# Patient Record
Sex: FEMALE | Race: WHITE | NOT HISPANIC OR LATINO
[De-identification: names, ages, dates, MRNs, and addresses within clinical notes are randomized per-mention and may not be internally consistent; named-entity substitution may affect disease eponyms.]

---

## 2022-09-12 ENCOUNTER — APPOINTMENT (OUTPATIENT)
Dept: NEUROLOGY | Facility: CLINIC | Age: 82
End: 2022-09-12

## 2022-09-12 VITALS
HEART RATE: 67 BPM | WEIGHT: 120 LBS | SYSTOLIC BLOOD PRESSURE: 141 MMHG | HEIGHT: 60 IN | BODY MASS INDEX: 23.56 KG/M2 | DIASTOLIC BLOOD PRESSURE: 83 MMHG

## 2022-09-12 DIAGNOSIS — Z78.9 OTHER SPECIFIED HEALTH STATUS: ICD-10-CM

## 2022-09-12 DIAGNOSIS — G47.09 OTHER INSOMNIA: ICD-10-CM

## 2022-09-12 DIAGNOSIS — Z81.8 FAMILY HISTORY OF OTHER MENTAL AND BEHAVIORAL DISORDERS: ICD-10-CM

## 2022-09-12 LAB
APTT BLD: 31.5 SEC
INR PPP: 1.05 RATIO
PT BLD: 12.4 SEC

## 2022-09-12 PROCEDURE — 99205 OFFICE O/P NEW HI 60 MIN: CPT

## 2022-09-12 RX ORDER — AMLODIPINE AND ATORVASTATIN 5; 10 MG/1; MG/1
5-10 TABLET, FILM COATED ORAL DAILY
Refills: 0 | Status: ACTIVE | COMMUNITY
Start: 2022-09-12

## 2022-09-12 RX ORDER — LOSARTAN POTASSIUM 50 MG/1
50 TABLET, FILM COATED ORAL DAILY
Refills: 0 | Status: ACTIVE | COMMUNITY
Start: 2022-09-12

## 2022-09-12 RX ORDER — METOPROLOL SUCCINATE 100 MG/1
100 TABLET, EXTENDED RELEASE ORAL DAILY
Refills: 0 | Status: ACTIVE | COMMUNITY
Start: 2022-09-12

## 2022-09-12 RX ORDER — PSYLLIUM HUSK 0.4 G
25 MCG CAPSULE ORAL
Refills: 0 | Status: ACTIVE | COMMUNITY
Start: 2022-09-12

## 2022-09-12 NOTE — REASON FOR VISIT
[Initial Evaluation] : an initial evaluation [Spouse] : spouse [Family Member] : family member [FreeTextEntry1] : Forgetfulness

## 2022-09-12 NOTE — PHYSICAL EXAM
[General Appearance - Alert] : alert [General Appearance - In No Acute Distress] : in no acute distress [Oriented To Time, Place, And Person] : oriented to person, place, and time [Impaired Insight] : insight and judgment were intact [Affect] : the affect was normal [Person] : oriented to person [Remote Intact] : remote memory intact [Registration Intact] : recent registration memory intact [Concentration Intact] : normal concentrating ability [Visual Intact] : visual attention was ~T not ~L decreased [Naming Objects] : no difficulty naming common objects [Repeating Phrases] : no difficulty repeating a phrase [Writing A Sentence] : no difficulty writing a sentence [Fluency] : fluency intact [Comprehension] : comprehension intact [Reading] : reading intact [Past History] : adequate knowledge of personal past history [Vocabulary] : adequate range of vocabulary [Total Score ___ / 30] : the patient achieved a score of [unfilled] /30 [Date / Time ___ / 5] : date / time [unfilled] / 5 [Place ___ / 5] : place [unfilled] / 5 [Registration ___ / 3] : registration [unfilled] / 3 [Serial Sevens ___/5] : serial sevens [unfilled] / 5 [Naming 2 Objects ___ / 2] : naming two objects [unfilled] / 2 [Repeating a Sentence ___ / 1] : repeating a sentence [unfilled] / 1 [Writing a Sentence ___ / 1] : write sentence [unfilled] / 1 [3-stage Verbal Command ___ / 3] : three-stage verbal command [unfilled] / 3 [Written Command ___ / 1] : written command [unfilled] / 1 [Copy a Design ___ / 1] : copy a design [unfilled] / 1 [Recall ___ / 3] : recall [unfilled] / 3 [Cranial Nerves Optic (II)] : visual acuity intact bilaterally,  visual fields full to confrontation, pupils equal round and reactive to light [Cranial Nerves Oculomotor (III)] : extraocular motion intact [Cranial Nerves Trigeminal (V)] : facial sensation intact symmetrically [Cranial Nerves Facial (VII)] : face symmetrical [Cranial Nerves Vestibulocochlear (VIII)] : hearing was intact bilaterally [Cranial Nerves Glossopharyngeal (IX)] : tongue and palate midline [Cranial Nerves Accessory (XI - Cranial And Spinal)] : head turning and shoulder shrug symmetric [Cranial Nerves Hypoglossal (XII)] : there was no tongue deviation with protrusion [Motor Strength] : muscle strength was normal in all four extremities [Involuntary Movements] : no involuntary movements were seen [No Muscle Atrophy] : normal bulk in all four extremities [Motor Handedness Right-Handed] : the patient is right hand dominant [Sensation Tactile Decrease] : light touch was intact [Balance] : balance was intact [2+] : Ankle jerk left 2+ [Sclera] : the sclera and conjunctiva were normal [PERRL With Normal Accommodation] : pupils were equal in size, round, reactive to light, with normal accommodation [Extraocular Movements] : extraocular movements were intact [Outer Ear] : the ears and nose were normal in appearance [Oropharynx] : the oropharynx was normal [Neck Appearance] : the appearance of the neck was normal [Neck Cervical Mass (___cm)] : no neck mass was observed [Jugular Venous Distention Increased] : there was no jugular-venous distention [Thyroid Diffuse Enlargement] : the thyroid was not enlarged [Thyroid Nodule] : there were no palpable thyroid nodules [Auscultation Breath Sounds / Voice Sounds] : lungs were clear to auscultation bilaterally [Heart Rate And Rhythm] : heart rate was normal and rhythm regular [Heart Sounds] : normal S1 and S2 [Heart Sounds Gallop] : no gallops [Murmurs] : no murmurs [Heart Sounds Pericardial Friction Rub] : no pericardial rub [Full Pulse] : the pedal pulses are present [Edema] : there was no peripheral edema [Bowel Sounds] : normal bowel sounds [Abdomen Soft] : soft [Abdomen Tenderness] : non-tender [Abdomen Mass (___ Cm)] : no abdominal mass palpated [No CVA Tenderness] : no ~M costovertebral angle tenderness [No Spinal Tenderness] : no spinal tenderness [Abnormal Walk] : normal gait [Nail Clubbing] : no clubbing  or cyanosis of the fingernails [Musculoskeletal - Swelling] : no joint swelling seen [Motor Tone] : muscle strength and tone were normal [Skin Color & Pigmentation] : normal skin color and pigmentation [Skin Turgor] : normal skin turgor [] : no rash [Place] : disoriented to place [Time] : disoriented to time [Short Term Intact] : short term memory impaired [Span Intact] : the attention span was decreased [Current Events] : inadequate knowledge of current events [Motor Strength Upper Extremities Bilaterally] : strength was normal in both upper extremities [Motor Strength Lower Extremities Bilaterally] : strength was normal in both lower extremities [Romberg's Sign] : Romberg's sign was negtive [Past-pointing] : there was no past-pointing [Tremor] : no tremor present [Plantar Reflex Right Only] : normal on the right [Plantar Reflex Left Only] : normal on the left [FreeTextEntry4] : Tends to get very anxious and flustered when questioned. \par Mental Status Exam\par Presidents: 1/5\par Alternating Pattern: ok\par Spiral: ok\par Clock: 1/3-numbers are irregularly displayed, some to the L some to the R, incomplete; can tell simple times\par Repetition: ok\par \par R/L discrimination on self and examiner: ok\par Cross-line commands: ok\par Praxis:\par -Motor: ok\par -Dynamic/Luria: forgets pattern\par -Ideomotor/Imitation: ok\par -Ideational/writing/closing-in:ok\par -Dressing: ok. [FreeTextEntry8] : cautioned gait, no parkinsonian signs

## 2022-09-12 NOTE — ASSESSMENT
[FreeTextEntry1] : Assessment:\par 83yo RH WW with cardiovascular risk factors, here for forgetfulness.\par Mostly amnestic exam, but VS and executive also affected. \par ADL still ok, IADL limited.\par MMSE 17-20/30, partially affected by anxiety.\par \par Diagnostic Impression:\par -forgetfulness\par -anxiety\par \par Plan:\par Rule out reversible and vascular causes:\par -B vitamins, TFT, RPR\par -MRI brain w/o sade\par -basic inflammatory labs\par -Lyme serology\par -would continue Mirtazapine and optimize to 30mg, for her anxiety\par -after cataract sx next week we will discuss Re starting Aricept.\par \par A thorough discussion was entertained with the patient/caregiver regarding the use of psychoactive medications, their possible benefits and AE profile, including the risk of cardiovascular complications, including but not limited to applicable black box warning and teratogenicity, where appropriate.\par We discussed the benefits of being active, physically and mentally, and the need to to establish a routine in this respect.\par Driving abilities and firearms possession and use were discussed, in relation to progression of the cognitive decline, and the need to assess them periodically.\par Patient/caregiver advised to bring previous records to this office.\par All questions were answered at the time of the visit. We are certainly available for further discussion as needed.\par Patient/caregiver fully understands and agree with the plan.\par

## 2022-09-12 NOTE — HISTORY OF PRESENT ILLNESS
[FreeTextEntry1] : NO COVID.\par COVID VACCINE FULL.\par \par \par HPI: 83yo RH WW with HTN, HLD, here for forgetfulness.\par Of note, Mother had dementia (AD?) since her '50s,  in her '80s.\par \par PMH:\par Over the last 1-2 years, she has shown progressive forgetfulness.\par \par -Memory: recent events, repeats questions, recent conversations; ok with names and faces\par -Speech: ok\par -Orientation: ok\par -Praxis: ok\par -Decision making/Executive fx/Multitasking: ok\par \par -Sleep: ok, no issues - takes Mirtazapine 15mg, doing ok\par \par -Appetite: ok, stable\par \par -Motor symptoms: ok, no issues; sporadic vertigo when she gets up in the am, not for a long time; keeps active\par \par -B/B: ok\par \par -Psychiatric symptoms: anxiety, possibly form frustration of forgetting\par \par -Functional status:\par Hand Index of [place] in Activities of Daily Living:\par 1. Bathing/Showerin\par 2. Dressin\par 3. Toiletin\par 4. Transferrin\par 5. Continence: 1\par 6: Feedin\par \par TOTAL: 6\par \par [place] Instrumental Activities of Daily Living:\par A. Ability to Use Telephone: 1\par B. Shoppin\par C. Food Preparation: 1\par D. Housekeepin\par E. Laundry: 1\par F. Transportation: 0\par G. Responsibility for Own Medications: 0\par H: Ability to Handle Finances: 0\par \par TOTAL: 5\par \par CDR: 0.5\par \par -Professional status: former banking employee.\par \par PCP and other physicians:\par -PCP: \par Mya Elmore MD\par Internist in [place], [place]\par COVID-19 info: [employer]\par Located in: [address] [facility]\par Address: [facility] [address], [place] [address]\par Phone: [phone]\par \par Workup done: none.

## 2022-09-13 LAB
ALBUMIN SERPL ELPH-MCNC: 4.6 G/DL
ALP BLD-CCNC: 66 U/L
ALT SERPL-CCNC: 13 U/L
ANION GAP SERPL CALC-SCNC: 14 MMOL/L
AST SERPL-CCNC: 20 U/L
B BURGDOR AB SER-IMP: NEGATIVE
B BURGDOR IGG+IGM SER QL: 0.06 INDEX
BASOPHILS # BLD AUTO: 0.08 K/UL
BASOPHILS NFR BLD AUTO: 1.1 %
BILIRUB SERPL-MCNC: 1 MG/DL
BUN SERPL-MCNC: 14 MG/DL
CALCIUM SERPL-MCNC: 10 MG/DL
CHLORIDE SERPL-SCNC: 106 MMOL/L
CO2 SERPL-SCNC: 24 MMOL/L
CREAT SERPL-MCNC: 0.84 MG/DL
EGFR: 69 ML/MIN/1.73M2
EOSINOPHIL # BLD AUTO: 0.08 K/UL
EOSINOPHIL NFR BLD AUTO: 1.1 %
FOLATE SERPL-MCNC: 17.7 NG/ML
GLUCOSE SERPL-MCNC: 102 MG/DL
HCT VFR BLD CALC: 44.7 %
HCYS SERPL-MCNC: 8.7 UMOL/L
HGB BLD-MCNC: 14.4 G/DL
IMM GRANULOCYTES NFR BLD AUTO: 0.3 %
LYMPHOCYTES # BLD AUTO: 1.09 K/UL
LYMPHOCYTES NFR BLD AUTO: 14.9 %
MAN DIFF?: NORMAL
MCHC RBC-ENTMCNC: 30.1 PG
MCHC RBC-ENTMCNC: 32.2 GM/DL
MCV RBC AUTO: 93.3 FL
MONOCYTES # BLD AUTO: 0.49 K/UL
MONOCYTES NFR BLD AUTO: 6.7 %
NEUTROPHILS # BLD AUTO: 5.54 K/UL
NEUTROPHILS NFR BLD AUTO: 75.9 %
PLATELET # BLD AUTO: 275 K/UL
POTASSIUM SERPL-SCNC: 4.4 MMOL/L
PROT SERPL-MCNC: 7.1 G/DL
RBC # BLD: 4.79 M/UL
RBC # FLD: 14.1 %
SODIUM SERPL-SCNC: 144 MMOL/L
T PALLIDUM AB SER QL IA: NEGATIVE
T3FREE SERPL-MCNC: 2.72 PG/ML
T4 FREE SERPL-MCNC: 1.2 NG/DL
TSH SERPL-ACNC: 3.71 UIU/ML
VIT B12 SERPL-MCNC: 554 PG/ML
WBC # FLD AUTO: 7.3 K/UL

## 2022-09-15 LAB — METHYLMALONATE SERPL-SCNC: 123 NMOL/L

## 2022-09-16 LAB — VIT B1 SERPL-MCNC: 153.4 NMOL/L

## 2022-10-30 ENCOUNTER — OUTPATIENT (OUTPATIENT)
Dept: OUTPATIENT SERVICES | Facility: HOSPITAL | Age: 82
LOS: 1 days | End: 2022-10-30
Payer: MEDICARE

## 2022-10-30 ENCOUNTER — APPOINTMENT (OUTPATIENT)
Dept: MRI IMAGING | Facility: CLINIC | Age: 82
End: 2022-10-30

## 2022-10-30 DIAGNOSIS — Z96.649 PRESENCE OF UNSPECIFIED ARTIFICIAL HIP JOINT: Chronic | ICD-10-CM

## 2022-10-30 DIAGNOSIS — R68.89 OTHER GENERAL SYMPTOMS AND SIGNS: ICD-10-CM

## 2022-10-30 DIAGNOSIS — Z98.89 OTHER SPECIFIED POSTPROCEDURAL STATES: Chronic | ICD-10-CM

## 2022-10-30 DIAGNOSIS — Z90.710 ACQUIRED ABSENCE OF BOTH CERVIX AND UTERUS: Chronic | ICD-10-CM

## 2022-10-30 PROCEDURE — 76377 3D RENDER W/INTRP POSTPROCES: CPT | Mod: 26

## 2022-10-30 PROCEDURE — 76377 3D RENDER W/INTRP POSTPROCES: CPT

## 2022-10-30 PROCEDURE — 70551 MRI BRAIN STEM W/O DYE: CPT

## 2022-10-30 PROCEDURE — 70551 MRI BRAIN STEM W/O DYE: CPT | Mod: 26,MH

## 2022-11-08 ENCOUNTER — NON-APPOINTMENT (OUTPATIENT)
Age: 82
End: 2022-11-08

## 2022-11-08 ENCOUNTER — APPOINTMENT (OUTPATIENT)
Dept: NEUROLOGY | Facility: CLINIC | Age: 82
End: 2022-11-08

## 2022-11-08 DIAGNOSIS — G31.84 MILD COGNITIVE IMPAIRMENT, SO STATED: ICD-10-CM

## 2022-11-08 DIAGNOSIS — I67.9 CEREBROVASCULAR DISEASE, UNSPECIFIED: ICD-10-CM

## 2022-11-08 RX ORDER — GATIFLOXACIN 5 MG/ML
0.5 SOLUTION/ DROPS OPHTHALMIC
Qty: 2 | Refills: 0 | Status: COMPLETED | COMMUNITY
Start: 2022-09-07

## 2022-11-08 RX ORDER — PREDNISOLONE ACETATE 10 MG/ML
1 SUSPENSION/ DROPS OPHTHALMIC
Qty: 5 | Refills: 0 | Status: COMPLETED | COMMUNITY
Start: 2022-09-30

## 2022-11-08 RX ORDER — BROMFENAC SODIUM 0.7 MG/ML
0.07 SOLUTION/ DROPS OPHTHALMIC
Qty: 3 | Refills: 0 | Status: COMPLETED | COMMUNITY
Start: 2022-09-07

## 2022-11-14 RX ORDER — DONEPEZIL HYDROCHLORIDE 5 MG/1
5 TABLET ORAL
Qty: 30 | Refills: 2 | Status: DISCONTINUED | COMMUNITY
Start: 2022-11-08 | End: 2022-11-14

## 2022-12-01 ENCOUNTER — RX RENEWAL (OUTPATIENT)
Age: 82
End: 2022-12-01

## 2023-01-15 ENCOUNTER — RX RENEWAL (OUTPATIENT)
Age: 83
End: 2023-01-15

## 2023-02-16 DIAGNOSIS — R45.1 RESTLESSNESS AND AGITATION: ICD-10-CM

## 2023-03-28 ENCOUNTER — NON-APPOINTMENT (OUTPATIENT)
Age: 83
End: 2023-03-28

## 2023-03-30 ENCOUNTER — RX RENEWAL (OUTPATIENT)
Age: 83
End: 2023-03-30

## 2023-04-30 ENCOUNTER — RX RENEWAL (OUTPATIENT)
Age: 83
End: 2023-04-30

## 2023-06-05 ENCOUNTER — NON-APPOINTMENT (OUTPATIENT)
Age: 83
End: 2023-06-05

## 2023-06-06 ENCOUNTER — APPOINTMENT (OUTPATIENT)
Dept: NEUROLOGY | Facility: CLINIC | Age: 83
End: 2023-06-06
Payer: MEDICARE

## 2023-06-06 ENCOUNTER — NON-APPOINTMENT (OUTPATIENT)
Age: 83
End: 2023-06-06

## 2023-06-06 DIAGNOSIS — R68.89 OTHER GENERAL SYMPTOMS AND SIGNS: ICD-10-CM

## 2023-06-06 PROCEDURE — 99214 OFFICE O/P EST MOD 30 MIN: CPT

## 2023-06-06 PROCEDURE — ZZZZZ: CPT

## 2023-06-06 RX ORDER — ESCITALOPRAM OXALATE 5 MG/1
5 TABLET ORAL
Qty: 90 | Refills: 1 | Status: DISCONTINUED | COMMUNITY
Start: 2022-11-14 | End: 2023-06-06

## 2023-06-06 NOTE — DATA REVIEWED
[No studies available for review at this time.] : No studies available for review at this time. [de-identified] : \par \par EXAM: [id number] - MR BRAIN DEMENTIA W 3D# - ORDERED BY: MANDY CHUN\par \par \par PROCEDURE DATE: 10/30/2022\par \par \par \par INTERPRETATION: MR of the brain without gadolinium contrast\par \par CLINICAL INFORMATION: Memory loss cognitive deficit\par \par TECHNIQUE: Sagittal and axial T1-weighted images, axial FLAIR images, axial susceptibility weighted images, axial T2-weighted images and axial diffusion weighted images of the brain were obtained. Volumetric acquisition: thin section T1-weighted gradient echo acquisition was obtained in the coronal plane.\par \par ARTIFICIAL INTELLIGENCE: This study was analyzed with [employer] software utilizing AI algorithms including deep machine learning to identify and quantitate specific brain regions and CSF spaces. These quantitative volumes were compared to aged matched population data sets. Please note that the complete data sets are available in the PACS systems in tabular format.\par \par COMPARISON: No relevant prior examinations are available for review.\par \par FINDINGS:\par \par BRAIN: The brain demonstrates extensive patchy and confluent lesions within the cerebral hemispheric white matter. These lesions are hyperintense on the long TR images, T1 hypointense were most confluent. There is considerable involvement of the subcortical, centrum semiovale, periatrial and corona radiata white matter of the cerebral hemispheres. Posterior periventricular involvement is also noted. No significant posterior fossa involvement is noted. No cerebral cortical lesion is recognized. No diffusion restriction is found in the brain. No acute cerebral cortical infarct is found. No intracranial hemorrhage is recognized. No mass effect is found in the brain.\par \par Quantitative evaluation of brain parenchymal volumes demonstrated the following significant findings: Abnormally low volumes: Left globus pallidus (1st percentile) and left inferior frontal cerebral cortex (3rd percentile) ; at each of these locations the bilateral volumes are also significantly low: globus pallidus (4th percentile) and inferior frontal cerebral cortex (3rd percentile)\par \par CSF SPACES: The ventricles, sulci and basal cisterns appear unremarkable.\par \par Quantitative evaluation of CSF spaces demonstrated the following significant findings: None\par \par VESSELS: The vertebral and internal carotid arteries demonstrate expected flow voids indicating their patency.\par \par HEAD AND NECK STRUCTURES: The orbits are unremarkable. Paranasal sinuses are clear. The nasal cavity appears intact. The central skull base appears intact. The nasopharynx is symmetric. The temporal bones appear clear of disease. The calvarium appears unremarkable.\par \par \par IMPRESSION:\par \par 1. Ischemic white matter disease greater than typical for age\par \par 2. Diffuse brain volume loss typical for age\par \par 3. Volumetric analysis demonstrated abnormally small volume left globus pallidus and left inferior frontal cortex\par \par --- End of Report ---

## 2023-06-06 NOTE — HISTORY OF PRESENT ILLNESS
[FreeTextEntry1] : Interval Hx: 2023\par \par Since last seen there has been a cognitive decline. She is repeating herself more frequently and has episodes of confusion mostly toward the evening time, not recognizing her . She was started on olanzapine 2.5 mg BID with some improvement. No issues with sleep or appetite. No falls or changes in motor function. ADLs and IADLs remain unchanged from prior. She  maintains a routine schedule, walks daily and is active with her . She was not able to tolerate donepezil in the past due to agitation. \par \par HPI INTERVAL; 2022\par \par NO COVID.\par COVID VACCINE FULL.\par \par HPI: 83yo RH WW with HTN, HLD, here for forgetfulness.\par Of note, Mother had dementia (AD?) since her '50s,  in her '80s.\par \par PMH:\par Over the last 1-2 years, she has shown progressive forgetfulness.\par \par -Memory: recent events, repeats questions, recent conversations; ok with names and faces\par -Speech: ok\par -Orientation: ok\par -Praxis: ok\par -Decision making/Executive fx/Multitasking: ok\par \par -Sleep: ok, no issues - takes Mirtazapine 15mg, doing ok\par \par -Appetite: ok, stable\par \par -Motor symptoms: ok, no issues; sporadic vertigo when she gets up in the am, not for a long time; keeps active\par \par -B/B: ok\par \par -Psychiatric symptoms: anxiety, possibly form frustration of forgetting\par \par -Functional status:\par Hand Index of [place] in Activities of Daily Living:\par 1. Bathing/Showerin\par 2. Dressin\par 3. Toiletin\par 4. Transferrin\par 5. Continence: 1\par 6: Feedin\par \par TOTAL: 6\par \par [place] Instrumental Activities of Daily Living:\par A. Ability to Use Telephone: 1\par B. Shoppin\par C. Food Preparation: 1\par D. Housekeepin\par E. Laundry: 1\par F. Transportation: 0\par G. Responsibility for Own Medications: 0\par H: Ability to Handle Finances: 0\par \par TOTAL: 5\par \par CDR: 0.5\par \par -Professional status: former banking employee.\par \par PCP and other physicians:\par -PCP: \par Mya Elmore MD\par Internist in [place], [place]\par COVID-19 info: [employer]\par Located in: [address] [facility]\par Address: [facility] [address], [place] [address]\par Phone: [phone]\par \par Workup done: none.

## 2023-06-06 NOTE — PHYSICAL EXAM
[General Appearance - Alert] : alert [General Appearance - In No Acute Distress] : in no acute distress [Oriented To Time, Place, And Person] : oriented to person, place, and time [Impaired Insight] : insight and judgment were intact [Affect] : the affect was normal [Person] : oriented to person [Visual Intact] : visual attention was ~T not ~L decreased [Naming Objects] : no difficulty naming common objects [Repeating Phrases] : no difficulty repeating a phrase [Reading] : reading intact [Place ___ / 5] : place [unfilled] / 5 [Registration ___ / 3] : registration [unfilled] / 3 [3-stage Verbal Command ___ / 3] : three-stage verbal command [unfilled] / 3 [Cranial Nerves Optic (II)] : visual acuity intact bilaterally,  visual fields full to confrontation, pupils equal round and reactive to light [Cranial Nerves Oculomotor (III)] : extraocular motion intact [Cranial Nerves Trigeminal (V)] : facial sensation intact symmetrically [Cranial Nerves Facial (VII)] : face symmetrical [Cranial Nerves Vestibulocochlear (VIII)] : hearing was intact bilaterally [Cranial Nerves Glossopharyngeal (IX)] : tongue and palate midline [Cranial Nerves Accessory (XI - Cranial And Spinal)] : head turning and shoulder shrug symmetric [Cranial Nerves Hypoglossal (XII)] : there was no tongue deviation with protrusion [Motor Tone] : muscle tone was normal in all four extremities [Motor Strength] : muscle strength was normal in all four extremities [Involuntary Movements] : no involuntary movements were seen [No Muscle Atrophy] : normal bulk in all four extremities [Motor Handedness Right-Handed] : the patient is right hand dominant [Sensation Tactile Decrease] : light touch was intact [Balance] : balance was intact [2+] : Ankle jerk left 2+ [Sclera] : the sclera and conjunctiva were normal [PERRL With Normal Accommodation] : pupils were equal in size, round, reactive to light, with normal accommodation [Extraocular Movements] : extraocular movements were intact [Auscultation Breath Sounds / Voice Sounds] : lungs were clear to auscultation bilaterally [No Spinal Tenderness] : no spinal tenderness [Date / Time ___ / 5] : date / time [unfilled] / 5 [Registration Intact] : recent registration memory intact [Total Score ___ / 30] : the patient achieved a score of [unfilled] /30 [Serial Sevens ___/5] : serial sevens [unfilled] / 5 [Naming 2 Objects ___ / 2] : naming two objects [unfilled] / 2 [Repeating a Sentence ___ / 1] : repeating a sentence [unfilled] / 1 [Written Command ___ / 1] : written command [unfilled] / 1 [Copy a Design ___ / 1] : copy a design [unfilled] / 1 [Recall ___ / 3] : recall [unfilled] / 3 [FreeTextEntry1] : Exam limited; due to severe anxiousness on exam \par \par Mental Status Exam\par Presidents: 1/5\par Alternating Pattern: issue\par Spiral: issue\par Repetition: ok\par \par R/L discrimination on self and examiner: ok\par Cross-line commands: ok\par Praxis:\par -Motor: ok\par -Dynamic/Luria: forgets pattern\par -Ideomotor/Imitation: ok\par -Ideational/writing/closing-in:ok\par  [Place] : disoriented to place [Time] : disoriented to time [Short Term Intact] : short term memory impaired [Remote Intact] : remote memory impaired [Span Intact] : the attention span was decreased [Concentration Intact] : a decrease in concentrating ability was observed [Motor Strength Upper Extremities Bilaterally] : strength was normal in both upper extremities [Motor Strength Lower Extremities Bilaterally] : strength was normal in both lower extremities [Romberg's Sign] : Romberg's sign was negtive [Past-pointing] : there was no past-pointing [Tremor] : no tremor present [Plantar Reflex Right Only] : normal on the right [Plantar Reflex Left Only] : normal on the left [Glabellar Reflex] : the glabellar reflex was absent [FreeTextEntry6] : b/ UE paratonia  [FreeTextEntry8] : cautioned slow steppage gait, no parkinsonian signs, turn ok. Can march in place

## 2023-06-06 NOTE — ASSESSMENT
[FreeTextEntry1] : Assessment:\par 81yo RH WW with cardiovascular risk factors, here for a follow up visit. Since last seen there has been a cognitive decline. She is now having episodes of confusion especially at night. She was started on olanzapine 2.5 mg BID with some improvement. No issues with sleep, appetite or motor function.  Remains independent with ADLs and no further decline in IADLs. She is engaged and active with her . Exam limited due to anxiousness on exam. \par \par Diagnostic Impression:\par -forgetfulness\par -anxiety\par -confusion \par \par Plan:\par Rule out reversible and vascular causes:\par [] decrease olanzapine to 2.5 mg in the evening\par [] consider starting rivastigmine patch in the future; unable to tolerate donepezil \par [] consider increasing  Mirtazapine and optimize to 30mg, for her anxiety\par [] will be having cataract surgery soon \par \par A thorough discussion was entertained with the patient/caregiver regarding the use of psychoactive medications, their possible benefits and AE profile, including the risk of cardiovascular complications, including but not limited to applicable black box warning and teratogenicity, where appropriate.\par We discussed the benefits of being active, physically and mentally, and the need to to establish a routine in this respect.\par Driving abilities and firearms possession and use were discussed, in relation to progression of the cognitive decline, and the need to assess them periodically.\par Patient/caregiver advised to bring previous records to this office.\par All questions were answered at the time of the visit. We are certainly available for further discussion as needed.\par Patient/caregiver fully understands and agree with the plan.\par

## 2023-06-23 ENCOUNTER — OFFICE (OUTPATIENT)
Dept: URBAN - METROPOLITAN AREA CLINIC 90 | Facility: CLINIC | Age: 83
Setting detail: OPHTHALMOLOGY
End: 2023-06-23
Payer: MEDICARE

## 2023-06-23 DIAGNOSIS — Z96.1: ICD-10-CM

## 2023-06-23 DIAGNOSIS — H25.12: ICD-10-CM

## 2023-06-23 DIAGNOSIS — H01.005: ICD-10-CM

## 2023-06-23 DIAGNOSIS — H11.153: ICD-10-CM

## 2023-06-23 DIAGNOSIS — H16.223: ICD-10-CM

## 2023-06-23 DIAGNOSIS — H01.002: ICD-10-CM

## 2023-06-23 PROCEDURE — 92014 COMPRE OPH EXAM EST PT 1/>: CPT | Performed by: OPHTHALMOLOGY

## 2023-06-23 ASSESSMENT — LID EXAM ASSESSMENTS
OS_BLEPHARITIS: LLL 2+
OD_BLEPHARITIS: RLL 2+

## 2023-06-23 ASSESSMENT — REFRACTION_CURRENTRX
OD_VPRISM_DIRECTION: BF
OD_SPHERE: +2.50
OS_OVR_VA: 20/
OD_SPHERE: -2.50
OD_ADD: +3.25
OS_SPHERE: -2.50
OD_SPHERE: -2.75
OD_VPRISM_DIRECTION: SV
OS_ADD: +3.25
OS_ADD: +3.00
OD_CYLINDER: +0.25
OS_AXIS: 083
OD_OVR_VA: 20/
OS_SPHERE: -2.75
OD_OVR_VA: 20/
OD_OVR_VA: 20/
OS_ADD: +3.25
OS_AXIS: 115
OS_SPHERE: -2.50
OD_AXIS: 005
OS_VPRISM_DIRECTION: SV
OS_VPRISM_DIRECTION: BF
OS_OVR_VA: 20/
OD_VPRISM_DIRECTION: BF
OS_CYLINDER: +0.25
OS_SPHERE: +2.75
OD_ADD: +3.25
OD_CYLINDER: --0.25
OS_VPRISM_DIRECTION: BF
OD_AXIS: 022
OS_CYLINDER: SPHERE
OD_VPRISM_DIRECTION: BF
OD_SPHERE: -2.50
OS_VPRISM_DIRECTION: BF
OS_OVR_VA: 20/
OD_ADD: +3.00
OS_CYLINDER: +0.50
OD_CYLINDER: SPHERE
OS_AXIS: 180
OD_AXIS: 128
OS_CYLINDER: -0.25
OD_CYLINDER: +1.25

## 2023-06-23 ASSESSMENT — REFRACTION_MANIFEST
OS_VA2: 20/30
OS_SPHERE: -1.50
OD_VA1: 20/50
OS_VA1: 20/40-2
OS_ADD: +2.50
OD_SPHERE: -3.00
OD_ADD: +3.00
OS_CYLINDER: +0.25
OS_VA1: 20/50+2
OD_VA1: 20/40-
OD_VA2: 20/30
OS_AXIS: 135
OS_ADD: +3.00
OD_AXIS: 030
OD_CYLINDER: +1.00
OD_CYLINDER: +0.75
OS_CYLINDER: +0.50
OS_SPHERE: -1.75
OD_AXIS: 030
OS_AXIS: 135
OD_ADD: +2.50
OD_SPHERE: -3.25

## 2023-06-23 ASSESSMENT — AXIALLENGTH_DERIVED
OS_AL: 23.4908
OS_AL: 24.1358
OS_AL: 23.3469
OD_AL: 23.5878
OD_AL: 23.8836
OD_AL: 23.9336

## 2023-06-23 ASSESSMENT — VISUAL ACUITY
OD_BCVA: 20/50
OS_BCVA: 20/25-2

## 2023-06-23 ASSESSMENT — KERATOMETRY
OS_K2POWER_DIOPTERS: 45.50
METHOD_AUTO_MANUAL: AUTO
OD_K2POWER_DIOPTERS: 45.75
OD_AXISANGLE_DEGREES: 017
OS_K1POWER_DIOPTERS: 45.50
OD_K1POWER_DIOPTERS: 45.25
OS_AXISANGLE_DEGREES: 090

## 2023-06-23 ASSESSMENT — REFRACTION_AUTOREFRACTION
OS_SPHERE: -3.00
OD_CYLINDER: -0.75
OS_AXIS: 110
OD_SPHERE: -1.50
OS_CYLINDER: -0.50
OD_AXIS: 097

## 2023-06-23 ASSESSMENT — SPHEQUIV_DERIVED
OS_SPHEQUIV: -1.625
OD_SPHEQUIV: -1.875
OS_SPHEQUIV: -3.25
OD_SPHEQUIV: -2.75
OS_SPHEQUIV: -1.25
OD_SPHEQUIV: -2.625

## 2023-06-23 ASSESSMENT — TONOMETRY
OS_IOP_MMHG: 16
OD_IOP_MMHG: 16

## 2023-06-23 ASSESSMENT — CORNEAL DYSTROPHY - POSTERIOR
OS_POSTERIORDYSTROPHY: GUTTATA
OD_POSTERIORDYSTROPHY: GUTTATA

## 2023-06-23 ASSESSMENT — TEAR BREAK UP TIME (TBUT)
OS_TBUT: 2+
OD_TBUT: 2+

## 2023-06-23 ASSESSMENT — CONFRONTATIONAL VISUAL FIELD TEST (CVF)
OD_FINDINGS: FULL
OS_FINDINGS: FULL

## 2023-09-12 ENCOUNTER — OFFICE (OUTPATIENT)
Dept: URBAN - METROPOLITAN AREA CLINIC 90 | Facility: CLINIC | Age: 83
Setting detail: OPHTHALMOLOGY
End: 2023-09-12
Payer: MEDICARE

## 2023-09-12 DIAGNOSIS — H25.12: ICD-10-CM

## 2023-09-12 PROCEDURE — 92136 OPHTHALMIC BIOMETRY: CPT | Performed by: OPHTHALMOLOGY

## 2023-09-12 ASSESSMENT — KERATOMETRY
OS_AXISANGLE_DEGREES: 090
OS_K1POWER_DIOPTERS: 45.75
METHOD_AUTO_MANUAL: AUTO
OD_AXISANGLE_DEGREES: 036
OD_K2POWER_DIOPTERS: 457509000
OS_K2POWER_DIOPTERS: 45.50
OD_K1POWER_DIOPTERS: 45.75

## 2023-09-12 ASSESSMENT — REFRACTION_CURRENTRX
OD_AXIS: 022
OS_VPRISM_DIRECTION: BF
OD_SPHERE: -2.50
OS_AXIS: 083
OD_ADD: +3.25
OD_OVR_VA: 20/
OS_VPRISM_DIRECTION: BF
OS_OVR_VA: 20/
OD_VPRISM_DIRECTION: BF
OD_CYLINDER: --0.25
OS_OVR_VA: 20/
OS_SPHERE: +2.75
OS_CYLINDER: -0.25
OS_CYLINDER: +0.50
OD_SPHERE: +2.50
OS_SPHERE: -2.75
OD_SPHERE: -2.75
OS_VPRISM_DIRECTION: BF
OD_VPRISM_DIRECTION: SV
OS_ADD: +3.25
OS_AXIS: 180
OD_CYLINDER: +1.25
OD_CYLINDER: +0.25
OD_AXIS: 128
OS_OVR_VA: 20/
OS_CYLINDER: SPHERE
OD_VPRISM_DIRECTION: BF
OD_OVR_VA: 20/
OS_AXIS: 115
OS_VPRISM_DIRECTION: SV
OD_ADD: +3.25
OD_VPRISM_DIRECTION: BF
OD_ADD: +3.00
OS_ADD: +3.25
OD_SPHERE: -2.50
OS_SPHERE: -2.50
OS_SPHERE: -2.50
OD_OVR_VA: 20/
OS_CYLINDER: +0.25
OS_ADD: +3.00
OD_AXIS: 005
OD_CYLINDER: SPHERE

## 2023-09-12 ASSESSMENT — REFRACTION_AUTOREFRACTION
OD_SPHERE: -2.00
OD_AXIS: 094
OS_SPHERE: -3.00
OD_CYLINDER: -0.50
OS_CYLINDER: -0.75
OS_AXIS: 136

## 2023-09-12 ASSESSMENT — REFRACTION_MANIFEST
OS_CYLINDER: +0.25
OD_CYLINDER: +0.75
OS_SPHERE: -1.50
OD_SPHERE: -3.25
OD_AXIS: 030
OS_AXIS: 135
OS_VA1: 20/40-2
OS_ADD: +3.00
OD_CYLINDER: +1.00
OD_VA2: 20/30
OD_VA1: 20/50
OS_VA1: 20/50+2
OD_ADD: +3.00
OS_VA2: 20/30
OS_ADD: +2.50
OS_SPHERE: -1.75
OD_VA1: 20/40-
OS_CYLINDER: +0.50
OD_SPHERE: -3.00
OD_ADD: +2.50
OD_AXIS: 030
OS_AXIS: 135

## 2023-09-12 ASSESSMENT — AXIALLENGTH_DERIVED
OS_AL: 23.302
OS_AL: 23.4454
OS_AL: 24.1387
OD_AL: 0

## 2023-09-12 ASSESSMENT — SPHEQUIV_DERIVED
OD_SPHEQUIV: -2.625
OS_SPHEQUIV: -3.375
OS_SPHEQUIV: -1.625
OD_SPHEQUIV: -2.75
OS_SPHEQUIV: -1.25
OD_SPHEQUIV: -2.25

## 2023-09-12 ASSESSMENT — VISUAL ACUITY
OD_BCVA: 20/400
OS_BCVA: 20/100

## 2023-09-12 ASSESSMENT — CONFRONTATIONAL VISUAL FIELD TEST (CVF)
OD_FINDINGS: FULL
OS_FINDINGS: FULL

## 2023-09-18 ENCOUNTER — AMBULATORY SURGERY CENTER (OUTPATIENT)
Dept: URBAN - METROPOLITAN AREA SURGERY 25 | Facility: SURGERY | Age: 83
Setting detail: OPHTHALMOLOGY
End: 2023-09-18
Payer: MEDICARE

## 2023-09-18 DIAGNOSIS — H25.22: ICD-10-CM

## 2023-09-18 PROCEDURE — 66984 XCAPSL CTRC RMVL W/O ECP: CPT | Performed by: OPHTHALMOLOGY

## 2023-09-19 ENCOUNTER — OFFICE (OUTPATIENT)
Dept: URBAN - METROPOLITAN AREA CLINIC 90 | Facility: CLINIC | Age: 83
Setting detail: OPHTHALMOLOGY
End: 2023-09-19
Payer: MEDICARE

## 2023-09-19 ENCOUNTER — RX ONLY (RX ONLY)
Age: 83
End: 2023-09-19

## 2023-09-19 DIAGNOSIS — H40.033: ICD-10-CM

## 2023-09-19 DIAGNOSIS — Z96.1: ICD-10-CM

## 2023-09-19 PROCEDURE — 99024 POSTOP FOLLOW-UP VISIT: CPT | Performed by: OPHTHALMOLOGY

## 2023-09-19 ASSESSMENT — REFRACTION_CURRENTRX
OD_SPHERE: -2.75
OD_SPHERE: -2.50
OS_SPHERE: -2.50
OD_OVR_VA: 20/
OS_AXIS: 115
OD_SPHERE: -2.50
OD_CYLINDER: +1.25
OS_ADD: +3.00
OS_AXIS: 180
OD_OVR_VA: 20/
OS_CYLINDER: +0.25
OD_AXIS: 005
OD_VPRISM_DIRECTION: SV
OS_CYLINDER: -0.25
OS_OVR_VA: 20/
OS_AXIS: 083
OS_CYLINDER: +0.50
OS_VPRISM_DIRECTION: BF
OD_CYLINDER: --0.25
OS_VPRISM_DIRECTION: SV
OS_CYLINDER: SPHERE
OD_ADD: +3.00
OS_VPRISM_DIRECTION: BF
OD_OVR_VA: 20/
OD_VPRISM_DIRECTION: BF
OD_ADD: +3.25
OD_CYLINDER: SPHERE
OS_ADD: +3.25
OD_VPRISM_DIRECTION: BF
OD_AXIS: 022
OS_SPHERE: +2.75
OD_AXIS: 128
OS_OVR_VA: 20/
OS_SPHERE: -2.75
OD_CYLINDER: +0.25
OS_OVR_VA: 20/
OS_ADD: +3.25
OS_VPRISM_DIRECTION: BF
OD_VPRISM_DIRECTION: BF
OD_SPHERE: +2.50
OD_ADD: +3.25
OS_SPHERE: -2.50

## 2023-09-19 ASSESSMENT — AXIALLENGTH_DERIVED
OS_AL: 23.1764
OD_AL: 23.8366
OS_AL: 23.5587
OD_AL: 23.6884
OS_AL: 23.0363
OD_AL: 23.8864

## 2023-09-19 ASSESSMENT — KERATOMETRY
OS_K2POWER_DIOPTERS: 46.75
OD_K2POWER_DIOPTERS: 46.00
OS_AXISANGLE_DEGREES: 086
METHOD_AUTO_MANUAL: AUTO
OD_K1POWER_DIOPTERS: 45.25
OS_K1POWER_DIOPTERS: 46.00
OD_AXISANGLE_DEGREES: 036

## 2023-09-19 ASSESSMENT — REFRACTION_MANIFEST
OS_SPHERE: -1.50
OD_ADD: +3.00
OD_SPHERE: -3.00
OS_SPHERE: -1.75
OD_AXIS: 030
OD_VA2: 20/30
OD_CYLINDER: +0.75
OS_ADD: +3.00
OS_AXIS: 135
OS_ADD: +2.50
OS_VA1: 20/50+2
OS_AXIS: 135
OD_ADD: +2.50
OS_VA2: 20/30
OS_VA1: 20/40-2
OS_CYLINDER: +0.25
OS_CYLINDER: +0.50
OD_VA1: 20/50
OD_AXIS: 030
OD_SPHERE: -3.25
OD_VA1: 20/40-
OD_CYLINDER: +1.00

## 2023-09-19 ASSESSMENT — TONOMETRY: OS_IOP_MMHG: 20

## 2023-09-19 ASSESSMENT — REFRACTION_AUTOREFRACTION
OD_AXIS: 111
OS_CYLINDER: -0.25
OD_SPHERE: -2.00
OD_CYLINDER: -0.50
OS_AXIS: 096
OS_SPHERE: -2.50

## 2023-09-19 ASSESSMENT — TEAR BREAK UP TIME (TBUT)
OD_TBUT: 2+
OS_TBUT: 2+

## 2023-09-19 ASSESSMENT — VISUAL ACUITY
OS_BCVA: 20/100
OD_BCVA: 20/400

## 2023-09-19 ASSESSMENT — SPHEQUIV_DERIVED
OD_SPHEQUIV: -2.25
OS_SPHEQUIV: -2.625
OD_SPHEQUIV: -2.625
OD_SPHEQUIV: -2.75
OS_SPHEQUIV: -1.25
OS_SPHEQUIV: -1.625

## 2023-09-19 ASSESSMENT — CORNEAL DYSTROPHY - POSTERIOR
OS_POSTERIORDYSTROPHY: GUTTATA
OD_POSTERIORDYSTROPHY: GUTTATA

## 2023-09-19 ASSESSMENT — LID EXAM ASSESSMENTS
OD_BLEPHARITIS: RLL 2+
OS_BLEPHARITIS: LLL 2+

## 2023-09-19 ASSESSMENT — CORNEAL EDEMA CLINICAL DESCRIPTION: OS_CORNEALEDEMA: T OVER THE INCISION

## 2023-09-26 ENCOUNTER — OFFICE (OUTPATIENT)
Dept: URBAN - METROPOLITAN AREA CLINIC 90 | Facility: CLINIC | Age: 83
Setting detail: OPHTHALMOLOGY
End: 2023-09-26
Payer: MEDICARE

## 2023-09-26 ENCOUNTER — RX ONLY (RX ONLY)
Age: 83
End: 2023-09-26

## 2023-09-26 DIAGNOSIS — Z96.1: ICD-10-CM

## 2023-09-26 PROBLEM — H16.222 DRY EYE SYNDROME K SICCA;  , LEFT EYE: Status: ACTIVE | Noted: 2023-09-26

## 2023-09-26 PROCEDURE — 99024 POSTOP FOLLOW-UP VISIT: CPT | Performed by: OPHTHALMOLOGY

## 2023-09-26 ASSESSMENT — REFRACTION_CURRENTRX
OS_SPHERE: -2.50
OD_OVR_VA: 20/
OS_CYLINDER: SPHERE
OD_CYLINDER: +1.25
OD_AXIS: 128
OD_OVR_VA: 20/
OS_VPRISM_DIRECTION: BF
OD_OVR_VA: 20/
OD_CYLINDER: SPHERE
OD_ADD: +3.00
OS_VPRISM_DIRECTION: SV
OS_AXIS: 083
OD_SPHERE: -2.75
OS_CYLINDER: -0.25
OD_VPRISM_DIRECTION: BF
OD_SPHERE: -2.50
OD_ADD: +3.25
OD_VPRISM_DIRECTION: BF
OS_SPHERE: -2.75
OS_OVR_VA: 20/
OS_SPHERE: -2.50
OD_ADD: +3.25
OS_CYLINDER: +0.50
OS_OVR_VA: 20/
OD_CYLINDER: +0.25
OS_CYLINDER: +0.25
OS_ADD: +3.25
OS_SPHERE: +2.75
OD_AXIS: 005
OS_VPRISM_DIRECTION: BF
OS_ADD: +3.00
OD_VPRISM_DIRECTION: SV
OD_AXIS: 022
OD_SPHERE: +2.50
OD_VPRISM_DIRECTION: BF
OS_VPRISM_DIRECTION: BF
OS_AXIS: 180
OS_AXIS: 115
OD_CYLINDER: --0.25
OS_ADD: +3.25
OS_OVR_VA: 20/
OD_SPHERE: -2.50

## 2023-09-26 ASSESSMENT — REFRACTION_MANIFEST
OS_VA1: 20/50+2
OS_CYLINDER: +0.50
OD_VA1: 20/40-
OS_AXIS: 135
OS_SPHERE: -1.75
OD_VA2: 20/30
OD_VA1: 20/50
OD_AXIS: 030
OS_CYLINDER: +0.25
OD_AXIS: 030
OS_SPHERE: -1.50
OD_SPHERE: -3.25
OD_ADD: +2.50
OS_ADD: +2.50
OD_CYLINDER: +0.75
OS_ADD: +3.00
OS_VA1: 20/40-2
OS_AXIS: 135
OD_CYLINDER: +1.00
OD_SPHERE: -3.00
OD_ADD: +3.00
OS_VA2: 20/30

## 2023-09-26 ASSESSMENT — TEAR BREAK UP TIME (TBUT)
OD_TBUT: 2+
OS_TBUT: 2+

## 2023-09-26 ASSESSMENT — CORNEAL DYSTROPHY - POSTERIOR
OS_POSTERIORDYSTROPHY: GUTTATA
OD_POSTERIORDYSTROPHY: GUTTATA

## 2023-09-26 ASSESSMENT — REFRACTION_AUTOREFRACTION
OS_CYLINDER: -0.75
OS_SPHERE: -1.75
OD_SPHERE: -1.75
OD_CYLINDER: -0.75
OS_AXIS: 090
OD_AXIS: 084

## 2023-09-26 ASSESSMENT — SPHEQUIV_DERIVED
OS_SPHEQUIV: -1.625
OD_SPHEQUIV: -2.125
OD_SPHEQUIV: -2.625
OS_SPHEQUIV: -2.125
OD_SPHEQUIV: -2.75
OS_SPHEQUIV: -1.25

## 2023-09-26 ASSESSMENT — KERATOMETRY
OS_K1POWER_DIOPTERS: 45.25
OD_AXISANGLE_DEGREES: 004
METHOD_AUTO_MANUAL: AUTO
OS_K2POWER_DIOPTERS: 45.50
OD_K1POWER_DIOPTERS: 46.00
OD_K2POWER_DIOPTERS: 46.25
OS_AXISANGLE_DEGREES: 106

## 2023-09-26 ASSESSMENT — CORNEAL EDEMA CLINICAL DESCRIPTION: OS_CORNEALEDEMA: T OVER THE INCISION

## 2023-09-26 ASSESSMENT — AXIALLENGTH_DERIVED
OS_AL: 23.5364
OS_AL: 23.3919
OD_AL: 23.6506
OS_AL: 23.7319
OD_AL: 23.6996
OD_AL: 23.4564

## 2023-09-26 ASSESSMENT — TONOMETRY: OS_IOP_MMHG: 16

## 2023-09-26 ASSESSMENT — VISUAL ACUITY
OD_BCVA: 20/40+2
OS_BCVA: 20/100

## 2023-09-26 ASSESSMENT — CONFRONTATIONAL VISUAL FIELD TEST (CVF)
OS_FINDINGS: FULL
OD_FINDINGS: FULL

## 2023-10-06 ENCOUNTER — NON-APPOINTMENT (OUTPATIENT)
Age: 83
End: 2023-10-06

## 2023-10-17 ENCOUNTER — OFFICE (OUTPATIENT)
Dept: URBAN - METROPOLITAN AREA CLINIC 90 | Facility: CLINIC | Age: 83
Setting detail: OPHTHALMOLOGY
End: 2023-10-17
Payer: MEDICARE

## 2023-10-17 DIAGNOSIS — Z96.1: ICD-10-CM

## 2023-10-17 PROBLEM — H16.223 DRY EYE SYNDROME K SICCA;  ,, BOTH EYES: Status: ACTIVE | Noted: 2023-10-17

## 2023-10-17 PROCEDURE — 99024 POSTOP FOLLOW-UP VISIT: CPT | Performed by: OPHTHALMOLOGY

## 2023-10-17 ASSESSMENT — REFRACTION_CURRENTRX
OS_ADD: +3.00
OD_SPHERE: +2.50
OD_SPHERE: -2.50
OS_SPHERE: -2.50
OD_OVR_VA: 20/
OD_SPHERE: -2.75
OS_AXIS: 083
OD_OVR_VA: 20/
OS_OVR_VA: 20/
OS_AXIS: 115
OS_SPHERE: -2.50
OS_ADD: +3.25
OS_SPHERE: -2.75
OD_ADD: +3.00
OS_AXIS: 180
OD_VPRISM_DIRECTION: BF
OS_VPRISM_DIRECTION: BF
OD_SPHERE: -2.50
OS_ADD: +3.25
OD_VPRISM_DIRECTION: BF
OD_AXIS: 005
OS_VPRISM_DIRECTION: SV
OS_OVR_VA: 20/
OD_AXIS: 022
OS_SPHERE: +2.75
OD_ADD: +3.25
OD_AXIS: 128
OS_CYLINDER: +0.25
OD_VPRISM_DIRECTION: SV
OD_VPRISM_DIRECTION: BF
OS_VPRISM_DIRECTION: BF
OS_VPRISM_DIRECTION: BF
OS_OVR_VA: 20/
OD_CYLINDER: +1.25
OS_CYLINDER: SPHERE
OS_CYLINDER: +0.50
OD_CYLINDER: SPHERE
OD_CYLINDER: --0.25
OD_CYLINDER: +0.25
OD_OVR_VA: 20/
OS_CYLINDER: -0.25
OD_ADD: +3.25

## 2023-10-17 ASSESSMENT — REFRACTION_MANIFEST
OD_VA2: 20/30
OD_AXIS: 090
OD_SPHERE: -2.00
OS_CYLINDER: -0.75
OS_CYLINDER: +0.50
OS_AXIS: 135
OS_VA1: 20/40-2
OD_VA1: 20/50
OS_SPHERE: -1.50
OS_AXIS: 080
OD_ADD: +3.00
OS_CYLINDER: +0.25
OS_SPHERE: -2.00
OD_CYLINDER: +1.00
OS_ADD: +2.50
OS_VA1: 20/50+2
OD_VA1: 20/40-
OD_SPHERE: -3.25
OD_ADD: +3.00
OS_ADD: +3.00
OS_ADD: +3.00
OD_AXIS: 030
OD_AXIS: 030
OD_ADD: +2.50
OD_CYLINDER: -0.50
OS_SPHERE: -1.75
OS_VA2: 20/30
OD_SPHERE: -3.00
OD_CYLINDER: +0.75
OS_AXIS: 135

## 2023-10-17 ASSESSMENT — KERATOMETRY
OS_AXISANGLE_DEGREES: 145
METHOD_AUTO_MANUAL: AUTO
OS_K1POWER_DIOPTERS: 45.50
OD_AXISANGLE_DEGREES: 090
OD_K1POWER_DIOPTERS: 45.75
OS_K2POWER_DIOPTERS: 45.50
OD_K2POWER_DIOPTERS: 45.75

## 2023-10-17 ASSESSMENT — CONFRONTATIONAL VISUAL FIELD TEST (CVF)
OD_FINDINGS: FULL
OS_FINDINGS: FULL

## 2023-10-17 ASSESSMENT — SPHEQUIV_DERIVED
OD_SPHEQUIV: -2.75
OD_SPHEQUIV: -2.25
OS_SPHEQUIV: -1.625
OD_SPHEQUIV: -2.625
OS_SPHEQUIV: -1.25
OD_SPHEQUIV: -2.25
OS_SPHEQUIV: -2.375
OS_SPHEQUIV: -2.375

## 2023-10-17 ASSESSMENT — AXIALLENGTH_DERIVED
OD_AL: 23.6422
OS_AL: 23.3469
OS_AL: 23.7842
OD_AL: 23.8394
OS_AL: 23.7842
OD_AL: 23.7898
OS_AL: 23.4908
OD_AL: 23.6422

## 2023-10-17 ASSESSMENT — TEAR BREAK UP TIME (TBUT)
OD_TBUT: 2+
OS_TBUT: 2+

## 2023-10-17 ASSESSMENT — REFRACTION_AUTOREFRACTION
OS_SPHERE: -2.00
OD_CYLINDER: -0.50
OD_SPHERE: -2.00
OD_AXIS: 090
OS_AXIS: 078
OS_CYLINDER: -0.75

## 2023-10-17 ASSESSMENT — TONOMETRY: OS_IOP_MMHG: 16

## 2023-10-17 ASSESSMENT — CORNEAL DYSTROPHY - POSTERIOR
OS_POSTERIORDYSTROPHY: GUTTATA
OD_POSTERIORDYSTROPHY: GUTTATA

## 2023-10-17 ASSESSMENT — CORNEAL EDEMA CLINICAL DESCRIPTION: OS_CORNEALEDEMA: ABSENT

## 2023-10-17 ASSESSMENT — VISUAL ACUITY
OD_BCVA: 20/50
OS_BCVA: 2/200

## 2023-12-04 ENCOUNTER — APPOINTMENT (OUTPATIENT)
Dept: NEUROLOGY | Facility: CLINIC | Age: 83
End: 2023-12-04
Payer: MEDICARE

## 2023-12-04 DIAGNOSIS — R45.1 RESTLESSNESS AND AGITATION: ICD-10-CM

## 2023-12-04 DIAGNOSIS — R41.89 OTHER SYMPTOMS AND SIGNS INVOLVING COGNITIVE FUNCTIONS AND AWARENESS: ICD-10-CM

## 2023-12-04 DIAGNOSIS — F03.C3: ICD-10-CM

## 2023-12-04 DIAGNOSIS — F41.9 ANXIETY DISORDER, UNSPECIFIED: ICD-10-CM

## 2023-12-04 PROCEDURE — 99214 OFFICE O/P EST MOD 30 MIN: CPT

## 2023-12-04 RX ORDER — OLANZAPINE 2.5 MG/1
2.5 TABLET, FILM COATED ORAL
Qty: 60 | Refills: 3 | Status: DISCONTINUED | COMMUNITY
Start: 2023-02-16 | End: 2023-12-04

## 2023-12-04 RX ORDER — ALPRAZOLAM 0.25 MG/1
0.25 TABLET ORAL TWICE DAILY
Qty: 60 | Refills: 0 | Status: DISCONTINUED | COMMUNITY
Start: 2022-11-14 | End: 2023-12-04

## 2023-12-05 ENCOUNTER — OFFICE (OUTPATIENT)
Dept: URBAN - METROPOLITAN AREA CLINIC 90 | Facility: CLINIC | Age: 83
Setting detail: OPHTHALMOLOGY
End: 2023-12-05
Payer: MEDICARE

## 2023-12-05 DIAGNOSIS — H11.153: ICD-10-CM

## 2023-12-05 DIAGNOSIS — H01.002: ICD-10-CM

## 2023-12-05 DIAGNOSIS — H01.005: ICD-10-CM

## 2023-12-05 DIAGNOSIS — H16.223: ICD-10-CM

## 2023-12-05 PROCEDURE — 99024 POSTOP FOLLOW-UP VISIT: CPT | Performed by: OPHTHALMOLOGY

## 2023-12-05 ASSESSMENT — REFRACTION_AUTOREFRACTION
OD_AXIS: 102
OS_SPHERE: -1.75
OS_CYLINDER: -1.00
OS_AXIS: 093
OD_SPHERE: -1.75
OD_CYLINDER: -0.75

## 2023-12-05 ASSESSMENT — REFRACTION_CURRENTRX
OD_ADD: +3.25
OD_VPRISM_DIRECTION: BF
OD_SPHERE: -2.50
OD_SPHERE: +2.50
OD_CYLINDER: --0.25
OS_CYLINDER: +0.50
OS_SPHERE: -2.75
OD_VPRISM_DIRECTION: SV
OS_SPHERE: -2.50
OS_AXIS: 180
OD_ADD: +3.25
OS_VPRISM_DIRECTION: BF
OD_CYLINDER: SPHERE
OD_AXIS: 128
OS_AXIS: 115
OD_ADD: +3.00
OS_OVR_VA: 20/
OS_OVR_VA: 20/
OS_VPRISM_DIRECTION: BF
OS_AXIS: 083
OS_OVR_VA: 20/
OS_ADD: +3.25
OD_OVR_VA: 20/
OS_ADD: +3.25
OD_CYLINDER: +1.25
OS_VPRISM_DIRECTION: BF
OS_CYLINDER: SPHERE
OD_VPRISM_DIRECTION: BF
OD_AXIS: 005
OD_OVR_VA: 20/
OD_SPHERE: -2.50
OS_CYLINDER: +0.25
OS_SPHERE: +2.75
OS_VPRISM_DIRECTION: SV
OS_SPHERE: -2.50
OD_SPHERE: -2.75
OS_CYLINDER: -0.25
OS_ADD: +3.00
OD_AXIS: 022
OD_VPRISM_DIRECTION: BF
OD_CYLINDER: +0.25
OD_OVR_VA: 20/

## 2023-12-05 ASSESSMENT — REFRACTION_MANIFEST
OD_ADD: +3.00
OD_VA1: 20/50
OS_CYLINDER: +0.25
OD_AXIS: 030
OD_VA2: 20/30
OS_CYLINDER: -0.75
OD_SPHERE: -3.00
OS_AXIS: 135
OS_SPHERE: -2.00
OS_ADD: +3.00
OS_VA2: 20/30
OS_ADD: +2.50
OD_AXIS: 030
OD_CYLINDER: +1.00
OD_VA1: 20/40-
OD_SPHERE: -2.00
OD_SPHERE: -3.25
OD_AXIS: 090
OS_ADD: +3.00
OS_SPHERE: -1.75
OS_AXIS: 135
OS_VA1: 20/50+2
OD_CYLINDER: -0.50
OD_ADD: +2.50
OD_ADD: +3.00
OD_CYLINDER: +0.75
OS_VA1: 20/40-2
OS_SPHERE: -1.50
OS_AXIS: 080
OS_CYLINDER: +0.50

## 2023-12-05 ASSESSMENT — CORNEAL DYSTROPHY - POSTERIOR
OS_POSTERIORDYSTROPHY: GUTTATA
OD_POSTERIORDYSTROPHY: GUTTATA

## 2023-12-05 ASSESSMENT — CORNEAL EDEMA CLINICAL DESCRIPTION: OS_CORNEALEDEMA: ABSENT

## 2023-12-05 ASSESSMENT — SPHEQUIV_DERIVED
OD_SPHEQUIV: -2.25
OS_SPHEQUIV: -2.25
OD_SPHEQUIV: -2.125
OS_SPHEQUIV: -1.625
OS_SPHEQUIV: -1.25
OS_SPHEQUIV: -2.375
OD_SPHEQUIV: -2.75
OD_SPHEQUIV: -2.625

## 2023-12-05 ASSESSMENT — LID EXAM ASSESSMENTS
OD_BLEPHARITIS: RLL 2+ 3+
OS_BLEPHARITIS: LLL 2+ 3+

## 2023-12-05 ASSESSMENT — INCREASING TEAR LAKE - SEVERITY SCORE
OS_INC_TEARLAKE: 1+
OD_INC_TEARLAKE: 1+

## 2023-12-05 ASSESSMENT — CONFRONTATIONAL VISUAL FIELD TEST (CVF)
OD_FINDINGS: FULL
OS_FINDINGS: FULL

## 2023-12-05 ASSESSMENT — TEAR BREAK UP TIME (TBUT)
OS_TBUT: 2+
OD_TBUT: 2+

## 2024-02-15 ENCOUNTER — OFFICE (OUTPATIENT)
Dept: URBAN - METROPOLITAN AREA CLINIC 90 | Facility: CLINIC | Age: 84
Setting detail: OPHTHALMOLOGY
End: 2024-02-15
Payer: MEDICARE

## 2024-02-15 DIAGNOSIS — H11.153: ICD-10-CM

## 2024-02-15 DIAGNOSIS — H16.223: ICD-10-CM

## 2024-02-15 DIAGNOSIS — H01.002: ICD-10-CM

## 2024-02-15 DIAGNOSIS — Z96.1: ICD-10-CM

## 2024-02-15 DIAGNOSIS — H01.005: ICD-10-CM

## 2024-02-15 PROCEDURE — 92014 COMPRE OPH EXAM EST PT 1/>: CPT | Performed by: OPHTHALMOLOGY

## 2024-02-15 ASSESSMENT — REFRACTION_CURRENTRX
OD_VPRISM_DIRECTION: SV
OS_SPHERE: -2.50
OS_OVR_VA: 20/
OD_CYLINDER: SPHERE
OS_AXIS: 180
OD_ADD: +3.25
OS_ADD: +3.00
OS_VPRISM_DIRECTION: BF
OS_VPRISM_DIRECTION: BF
OD_SPHERE: -2.75
OS_CYLINDER: +0.50
OD_SPHERE: +2.50
OD_VPRISM_DIRECTION: BF
OS_SPHERE: -2.50
OS_VPRISM_DIRECTION: BF
OD_VPRISM_DIRECTION: BF
OS_ADD: +3.25
OD_ADD: +3.25
OS_SPHERE: +2.75
OD_CYLINDER: +1.25
OD_AXIS: 128
OD_CYLINDER: +0.25
OS_VPRISM_DIRECTION: SV
OD_OVR_VA: 20/
OS_CYLINDER: -0.25
OD_AXIS: 005
OS_OVR_VA: 20/
OD_OVR_VA: 20/
OS_CYLINDER: +0.25
OS_CYLINDER: SPHERE
OD_OVR_VA: 20/
OS_AXIS: 083
OD_SPHERE: -2.50
OD_CYLINDER: --0.25
OD_VPRISM_DIRECTION: BF
OS_SPHERE: -2.75
OS_AXIS: 115
OD_SPHERE: -2.50
OD_AXIS: 022
OD_ADD: +3.00
OS_ADD: +3.25
OS_OVR_VA: 20/

## 2024-02-15 ASSESSMENT — REFRACTION_MANIFEST
OD_ADD: +3.00
OS_CYLINDER: +0.25
OS_ADD: +2.50
OS_CYLINDER: +0.50
OD_ADD: +2.50
OS_ADD: +3.00
OS_VA2: 20/30
OD_CYLINDER: +1.00
OS_VA1: 20/50+2
OS_AXIS: 080
OD_SPHERE: -2.00
OD_VA2: 20/30
OD_SPHERE: -3.25
OS_AXIS: 135
OD_CYLINDER: -0.50
OD_AXIS: 090
OD_VA1: 20/50
OS_SPHERE: -1.50
OS_SPHERE: -1.75
OD_ADD: +3.00
OS_ADD: +3.00
OD_CYLINDER: +0.75
OS_AXIS: 135
OD_AXIS: 030
OS_VA1: 20/40-2
OD_VA1: 20/40-
OS_SPHERE: -2.00
OD_SPHERE: -3.00
OS_CYLINDER: -0.75
OD_AXIS: 030

## 2024-02-15 ASSESSMENT — REFRACTION_AUTOREFRACTION
OS_AXIS: 087
OD_AXIS: 103
OD_CYLINDER: -0.75
OD_SPHERE: -1.75
OS_CYLINDER: -1.50
OS_SPHERE: -1.50

## 2024-02-15 ASSESSMENT — SPHEQUIV_DERIVED
OD_SPHEQUIV: -2.75
OD_SPHEQUIV: -2.625
OS_SPHEQUIV: -1.625
OD_SPHEQUIV: -2.25
OS_SPHEQUIV: -2.25
OS_SPHEQUIV: -1.25
OS_SPHEQUIV: -2.375
OD_SPHEQUIV: -2.125

## 2024-02-15 ASSESSMENT — CORNEAL EDEMA CLINICAL DESCRIPTION: OS_CORNEALEDEMA: ABSENT

## 2024-02-15 ASSESSMENT — TEAR BREAK UP TIME (TBUT)
OD_TBUT: 2+
OS_TBUT: 2+

## 2024-02-15 ASSESSMENT — INCREASING TEAR LAKE - SEVERITY SCORE
OS_INC_TEARLAKE: 1+
OD_INC_TEARLAKE: 1+

## 2024-02-15 ASSESSMENT — CONFRONTATIONAL VISUAL FIELD TEST (CVF)
OS_FINDINGS: FULL
OD_FINDINGS: FULL

## 2024-02-15 ASSESSMENT — LID EXAM ASSESSMENTS
OD_BLEPHARITIS: RLL 2+ 3+
OS_BLEPHARITIS: LLL 2+ 3+

## 2024-02-15 ASSESSMENT — CORNEAL DYSTROPHY - POSTERIOR
OS_POSTERIORDYSTROPHY: GUTTATA
OD_POSTERIORDYSTROPHY: GUTTATA

## 2024-05-29 RX ORDER — RISPERIDONE 0.5 MG/1
0.5 TABLET, FILM COATED ORAL
Qty: 30 | Refills: 3 | Status: ACTIVE | COMMUNITY
Start: 2023-12-04 | End: 1900-01-01

## 2024-06-08 ENCOUNTER — RX RENEWAL (OUTPATIENT)
Age: 84
End: 2024-06-08

## 2024-06-08 RX ORDER — MIRTAZAPINE 30 MG/1
30 TABLET, FILM COATED ORAL
Qty: 90 | Refills: 3 | Status: ACTIVE | COMMUNITY
Start: 2022-09-12 | End: 1900-01-01

## 2024-07-11 RX ORDER — BREXPIPRAZOLE 0.5 MG/1
0.5 TABLET ORAL
Qty: 30 | Refills: 3 | Status: ACTIVE | COMMUNITY
Start: 2024-07-11 | End: 1900-01-01

## 2024-08-15 ENCOUNTER — OFFICE (OUTPATIENT)
Dept: URBAN - METROPOLITAN AREA CLINIC 90 | Facility: CLINIC | Age: 84
Setting detail: OPHTHALMOLOGY
End: 2024-08-15
Payer: MEDICARE

## 2024-08-15 DIAGNOSIS — H40.033: ICD-10-CM

## 2024-08-15 DIAGNOSIS — H16.223: ICD-10-CM

## 2024-08-15 DIAGNOSIS — Z96.1: ICD-10-CM

## 2024-08-15 DIAGNOSIS — H01.002: ICD-10-CM

## 2024-08-15 DIAGNOSIS — H11.153: ICD-10-CM

## 2024-08-15 DIAGNOSIS — H01.005: ICD-10-CM

## 2024-08-15 PROCEDURE — 92014 COMPRE OPH EXAM EST PT 1/>: CPT | Performed by: OPHTHALMOLOGY

## 2024-08-15 ASSESSMENT — LID EXAM ASSESSMENTS
OD_BLEPHARITIS: RLL 2+ 3+
OS_BLEPHARITIS: LLL 2+ 3+

## 2024-08-15 ASSESSMENT — CONFRONTATIONAL VISUAL FIELD TEST (CVF)
OD_FINDINGS: FULL
OS_FINDINGS: FULL

## 2024-12-02 ENCOUNTER — APPOINTMENT (OUTPATIENT)
Dept: NEUROLOGY | Facility: CLINIC | Age: 84
End: 2024-12-02
Payer: MEDICARE

## 2024-12-02 VITALS
HEIGHT: 57 IN | DIASTOLIC BLOOD PRESSURE: 65 MMHG | SYSTOLIC BLOOD PRESSURE: 138 MMHG | HEART RATE: 67 BPM | BODY MASS INDEX: 23.3 KG/M2 | WEIGHT: 108 LBS

## 2024-12-02 VITALS — WEIGHT: 107 LBS | BODY MASS INDEX: 20.9 KG/M2

## 2024-12-02 DIAGNOSIS — R41.89 OTHER SYMPTOMS AND SIGNS INVOLVING COGNITIVE FUNCTIONS AND AWARENESS: ICD-10-CM

## 2024-12-02 DIAGNOSIS — F03.C3: ICD-10-CM

## 2024-12-02 DIAGNOSIS — I67.9 CEREBROVASCULAR DISEASE, UNSPECIFIED: ICD-10-CM

## 2024-12-02 DIAGNOSIS — F02.80 ALZHEIMER'S DISEASE, UNSPECIFIED: ICD-10-CM

## 2024-12-02 DIAGNOSIS — R45.1 RESTLESSNESS AND AGITATION: ICD-10-CM

## 2024-12-02 DIAGNOSIS — F41.9 ANXIETY DISORDER, UNSPECIFIED: ICD-10-CM

## 2024-12-02 DIAGNOSIS — G30.9 ALZHEIMER'S DISEASE, UNSPECIFIED: ICD-10-CM

## 2024-12-02 DIAGNOSIS — F01.50 ALZHEIMER'S DISEASE, UNSPECIFIED: ICD-10-CM

## 2024-12-02 DIAGNOSIS — G31.84 MILD COGNITIVE IMPAIRMENT, SO STATED: ICD-10-CM

## 2024-12-02 DIAGNOSIS — G47.09 OTHER INSOMNIA: ICD-10-CM

## 2024-12-02 PROCEDURE — 99214 OFFICE O/P EST MOD 30 MIN: CPT

## 2024-12-02 PROCEDURE — 99204 OFFICE O/P NEW MOD 45 MIN: CPT

## 2024-12-02 PROCEDURE — G2211 COMPLEX E/M VISIT ADD ON: CPT

## 2024-12-12 RX ORDER — ARIPIPRAZOLE 5 MG/1
5 TABLET ORAL DAILY
Qty: 30 | Refills: 3 | Status: ACTIVE | COMMUNITY
Start: 2024-12-12 | End: 1900-01-01

## 2025-05-27 ENCOUNTER — RX RENEWAL (OUTPATIENT)
Age: 85
End: 2025-05-27

## 2025-08-20 ENCOUNTER — OFFICE (OUTPATIENT)
Facility: LOCATION | Age: 85
Setting detail: OPHTHALMOLOGY
End: 2025-08-20
Payer: MEDICARE

## 2025-08-20 DIAGNOSIS — H01.005: ICD-10-CM

## 2025-08-20 DIAGNOSIS — H35.362: ICD-10-CM

## 2025-08-20 DIAGNOSIS — Z96.1: ICD-10-CM

## 2025-08-20 DIAGNOSIS — H11.153: ICD-10-CM

## 2025-08-20 DIAGNOSIS — H16.223: ICD-10-CM

## 2025-08-20 DIAGNOSIS — H01.002: ICD-10-CM

## 2025-08-20 PROCEDURE — 92014 COMPRE OPH EXAM EST PT 1/>: CPT | Performed by: OPHTHALMOLOGY

## 2025-08-20 ASSESSMENT — REFRACTION_CURRENTRX
OD_AXIS: 022
OD_VPRISM_DIRECTION: SV
OD_OVR_VA: 20/
OS_OVR_VA: 20/
OS_CYLINDER: SPHERE
OD_ADD: +3.25
OS_SPHERE: -2.50
OD_SPHERE: -2.75
OD_CYLINDER: +1.25
OS_ADD: +3.00
OD_ADD: +3.00
OD_CYLINDER: --0.25
OD_CYLINDER: SPHERE
OS_ADD: +3.25
OS_AXIS: 115
OS_VPRISM_DIRECTION: BF
OS_VPRISM_DIRECTION: BF
OS_OVR_VA: 20/
OS_AXIS: 083
OD_VPRISM_DIRECTION: BF
OD_CYLINDER: +0.25
OS_CYLINDER: -0.25
OD_SPHERE: -2.50
OS_SPHERE: +2.75
OS_CYLINDER: +0.50
OS_CYLINDER: +0.25
OD_SPHERE: -2.50
OD_OVR_VA: 20/
OD_SPHERE: +2.50
OS_OVR_VA: 20/
OD_OVR_VA: 20/
OD_VPRISM_DIRECTION: BF
OD_AXIS: 128
OS_VPRISM_DIRECTION: SV
OD_ADD: +3.25
OS_SPHERE: -2.75
OS_SPHERE: -2.50
OD_AXIS: 005
OS_VPRISM_DIRECTION: BF
OD_VPRISM_DIRECTION: BF
OS_AXIS: 180
OS_ADD: +3.25

## 2025-08-20 ASSESSMENT — CORNEAL DYSTROPHY - POSTERIOR
OD_POSTERIORDYSTROPHY: GUTTATA
OS_POSTERIORDYSTROPHY: GUTTATA

## 2025-08-20 ASSESSMENT — INCREASING TEAR LAKE - SEVERITY SCORE
OD_INC_TEARLAKE: 1+
OS_INC_TEARLAKE: 1+

## 2025-08-20 ASSESSMENT — REFRACTION_AUTOREFRACTION
OD_CYLINDER: -0.75
OS_CYLINDER: -1.50
OS_AXIS: 084
OD_SPHERE: -1.75
OD_AXIS: 101
OS_SPHERE: -1.25

## 2025-08-20 ASSESSMENT — CONFRONTATIONAL VISUAL FIELD TEST (CVF)
OS_FINDINGS: FULL
OD_FINDINGS: FULL

## 2025-08-20 ASSESSMENT — REFRACTION_MANIFEST
OD_CYLINDER: +0.75
OS_AXIS: 135
OD_CYLINDER: +1.00
OD_ADD: +2.50
OD_SPHERE: -2.00
OS_SPHERE: -1.50
OS_CYLINDER: +0.50
OD_AXIS: 090
OS_ADD: +2.50
OD_SPHERE: -3.25
OD_VA2: 20/30
OS_SPHERE: -2.00
OD_SPHERE: -3.00
OD_ADD: +3.00
OS_VA2: 20/30
OD_VA1: 20/50
OD_AXIS: 030
OS_AXIS: 080
OS_VA1: 20/40-2
OS_ADD: +3.00
OS_ADD: +3.00
OS_CYLINDER: +0.25
OS_SPHERE: -1.75
OD_CYLINDER: -0.50
OS_CYLINDER: -0.75
OS_VA1: 20/50+2
OD_AXIS: 030
OD_VA1: 20/40-
OS_AXIS: 135
OD_ADD: +3.00

## 2025-08-20 ASSESSMENT — KERATOMETRY
OD_K1POWER_DIOPTERS: 45.75
OD_K2POWER_DIOPTERS: 46.00
OS_K2POWER_DIOPTERS: 45.50
METHOD_AUTO_MANUAL: AUTO
OD_AXISANGLE_DEGREES: 061
OS_K1POWER_DIOPTERS: 45.00
OS_AXISANGLE_DEGREES: 176

## 2025-08-20 ASSESSMENT — TEAR BREAK UP TIME (TBUT)
OD_TBUT: 2+
OS_TBUT: 2+

## 2025-08-20 ASSESSMENT — LID EXAM ASSESSMENTS
OD_BLEPHARITIS: RLL 2+ 3+
OS_BLEPHARITIS: LLL 2+ 3+

## 2025-08-20 ASSESSMENT — CORNEAL EDEMA CLINICAL DESCRIPTION: OS_CORNEALEDEMA: ABSENT

## 2025-08-20 ASSESSMENT — TONOMETRY
OD_IOP_MMHG: 17
OS_IOP_MMHG: 18

## 2025-08-20 ASSESSMENT — VISUAL ACUITY
OD_BCVA: 20/125-1
OS_BCVA: 20/200